# Patient Record
Sex: MALE | Race: BLACK OR AFRICAN AMERICAN | NOT HISPANIC OR LATINO | Employment: STUDENT | URBAN - METROPOLITAN AREA
[De-identification: names, ages, dates, MRNs, and addresses within clinical notes are randomized per-mention and may not be internally consistent; named-entity substitution may affect disease eponyms.]

---
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
## 2023-01-12 ENCOUNTER — HOSPITAL ENCOUNTER (OUTPATIENT)
Dept: CARDIOLOGY | Facility: HOSPITAL | Age: 18
Discharge: HOME | End: 2023-01-12
Attending: NURSE PRACTITIONER
Payer: COMMERCIAL

## 2023-01-12 DIAGNOSIS — Z79.899 ENCOUNTER FOR LONG-TERM (CURRENT) USE OF OTHER MEDICATIONS: ICD-10-CM

## 2023-01-12 PROCEDURE — 93005 ELECTROCARDIOGRAM TRACING: CPT | Mod: PO

## 2023-01-12 PROCEDURE — 93010 ELECTROCARDIOGRAM REPORT: CPT | Mod: ,,, | Performed by: INTERNAL MEDICINE

## 2023-01-12 PROCEDURE — 93010 EKG 12-LEAD: ICD-10-PCS | Mod: ,,, | Performed by: INTERNAL MEDICINE

## 2023-10-15 ENCOUNTER — HOSPITAL ENCOUNTER (EMERGENCY)
Facility: HOSPITAL | Age: 18
Discharge: HOME | End: 2023-10-15
Attending: EMERGENCY MEDICINE
Payer: COMMERCIAL

## 2023-10-15 VITALS
SYSTOLIC BLOOD PRESSURE: 132 MMHG | HEIGHT: 69 IN | OXYGEN SATURATION: 99 % | WEIGHT: 219.56 LBS | TEMPERATURE: 98 F | RESPIRATION RATE: 19 BRPM | HEART RATE: 85 BPM | BODY MASS INDEX: 32.52 KG/M2 | DIASTOLIC BLOOD PRESSURE: 85 MMHG

## 2023-10-15 DIAGNOSIS — S63.636A SPRAIN OF INTERPHALANGEAL JOINT OF RIGHT LITTLE FINGER, INITIAL ENCOUNTER: Primary | ICD-10-CM

## 2023-10-15 DIAGNOSIS — J06.9 VIRAL URI: ICD-10-CM

## 2023-10-15 LAB
INFLUENZA A, MOLECULAR: NEGATIVE
INFLUENZA B, MOLECULAR: NEGATIVE
SARS-COV-2 RDRP RESP QL NAA+PROBE: NEGATIVE
SPECIMEN SOURCE: NORMAL

## 2023-10-15 PROCEDURE — 25000003 PHARM REV CODE 250: Mod: ER | Performed by: NURSE PRACTITIONER

## 2023-10-15 PROCEDURE — U0002 COVID-19 LAB TEST NON-CDC: HCPCS | Mod: ER | Performed by: FAMILY MEDICINE

## 2023-10-15 PROCEDURE — 87502 INFLUENZA DNA AMP PROBE: CPT | Mod: ER | Performed by: FAMILY MEDICINE

## 2023-10-15 PROCEDURE — 99283 EMERGENCY DEPT VISIT LOW MDM: CPT | Mod: ER

## 2023-10-15 RX ORDER — ONDANSETRON 4 MG/1
4 TABLET, ORALLY DISINTEGRATING ORAL
Status: COMPLETED | OUTPATIENT
Start: 2023-10-15 | End: 2023-10-15

## 2023-10-15 RX ADMIN — ONDANSETRON 4 MG: 4 TABLET, ORALLY DISINTEGRATING ORAL at 04:10

## 2023-10-15 NOTE — ED PROVIDER NOTES
"Encounter Date: 10/15/2023       History     Chief Complaint   Patient presents with    Vomiting     Vomting started Friday. No fever. No diarrhea. Mom would like pt tested for covid. No vomiting today    Finger Pain     C/o right 5th digit finger pain. Football jammed finger. Redness and mild swelling no open wounds or deformit.     19 y/o male which presents to the ED with right 5th finger pain, swelling, and ecchymosis and vomiting yesterday. Mom states the child is coughing up "cold." Denies fevers. No other complaints. Tolerating food and liquids today.     The history is provided by the patient.     Review of patient's allergies indicates:  No Known Allergies  Past Medical History:   Diagnosis Date    ADHD (attention deficit hyperactivity disorder)     Asthma     Eczema     Speech delay      Past Surgical History:   Procedure Laterality Date    CIRCUMCISION, PRIMARY       Family History   Problem Relation Age of Onset    Hypertension Mother     Polycystic ovary syndrome Mother     Asthma Father     Hypertension Maternal Grandmother     Other Maternal Grandmother         reflux    Migraines Maternal Grandmother     Mental illness Maternal Grandmother     Hypertension Maternal Grandfather     Diabetes Maternal Grandfather     Asthma Paternal Grandfather     Mental illness Paternal Grandfather      Social History     Tobacco Use    Smoking status: Passive Smoke Exposure - Never Smoker    Smokeless tobacco: Current    Tobacco comments:     Mom smokes outside   Substance Use Topics    Alcohol use: No    Drug use: No     Review of Systems   Constitutional:  Negative for fever.   HENT:  Negative for sore throat.    Respiratory:  Negative for shortness of breath.    Cardiovascular:  Negative for chest pain.   Gastrointestinal:  Positive for vomiting (yesterday twice but since has resolved). Negative for nausea.   Genitourinary:  Negative for dysuria.   Musculoskeletal:  Positive for arthralgias and joint swelling. " Negative for back pain.   Skin:  Positive for color change. Negative for rash.   Neurological:  Negative for weakness.   Hematological:  Does not bruise/bleed easily.   All other systems reviewed and are negative.    Physical Exam     Initial Vitals [10/15/23 1636]   BP Pulse Resp Temp SpO2   132/85 85 19 98.3 °F (36.8 °C) 99 %      MAP       --         Physical Exam    Nursing note and vitals reviewed.  Constitutional: He appears well-developed and well-nourished. He is cooperative.  Non-toxic appearance.   HENT:   Head: Normocephalic and atraumatic.   Right Ear: Hearing and ear canal normal. Tympanic membrane is not injected and not perforated. A middle ear effusion is present.   Left Ear: Hearing and ear canal normal. Tympanic membrane is not injected and not perforated. A middle ear effusion is present.   Nose: Mucosal edema present.   Mouth/Throat: Uvula is midline and mucous membranes are normal.   Swollen turbinates noted to bilateral nares and cobblestone appearance to posterior oropharynx   Eyes: Conjunctivae and EOM are normal. Pupils are equal, round, and reactive to light.   Neck:   Normal range of motion.  Cardiovascular:  Normal rate, regular rhythm, S1 normal, S2 normal, normal heart sounds, intact distal pulses and normal pulses.     Exam reveals no gallop and no friction rub.       No murmur heard.  Pulmonary/Chest: Breath sounds normal. No stridor. No respiratory distress. He has no wheezes. He has no rhonchi. He has no rales. He exhibits no tenderness.   Musculoskeletal:         General: Tenderness and edema present. Normal range of motion.      Cervical back: Normal range of motion.      Comments: Mild edema and ecchymosis noted to right 5th finger- ROM WNL     Lymphadenopathy:     He has no cervical adenopathy.   Neurological: He is alert and oriented to person, place, and time. He has normal strength. GCS score is 15. GCS eye subscore is 4. GCS verbal subscore is 5. GCS motor subscore is 6.        ED Course   Procedures  Labs Reviewed   INFLUENZA A & B BY MOLECULAR   SARS-COV-2 RNA AMPLIFICATION, QUAL    Narrative:     Is the patient symptomatic?->Yes          Imaging Results              X-Ray Finger 2 or More Views Right (Final result)  Result time 10/15/23 18:14:54      Final result by Remi Carmen MD (10/15/23 18:14:54)                   Impression:      As above.      Electronically signed by: Remi Carmen  Date:    10/15/2023  Time:    18:14               Narrative:    EXAMINATION:  XR FINGER 2 OR MORE VIEWS RIGHT    CLINICAL HISTORY:  right 5th finger pain, swelling and bruising;    TECHNIQUE:  Three views 5th digit radiographs.    COMPARISON:  None    FINDINGS:  No fracture.  No traumatic malalignment.  No osseous destructive process.  Suspected soft tissue swelling.  No radiopaque foreign body.                                       Medications   ondansetron disintegrating tablet 4 mg (4 mg Oral Given 10/15/23 1649)     Medical Decision Making  17 y/o male which presents to the ED with multiple complaints. Mom wanted him tested for COVID which was negative. Influenza was also negative. Xray of finger was negative for fracture. Pt advised to take OTC meds for pain and for any congestion. Patient given strict return precautions and voiced understanding of all discharge instructions. Pt was stable at discharge.           Problems Addressed:  Sprain of interphalangeal joint of right little finger, initial encounter: acute illness or injury  Viral URI: acute illness or injury    Amount and/or Complexity of Data Reviewed  Labs:  Decision-making details documented in ED Course.  Radiology: ordered.    Risk  OTC drugs.  Prescription drug management.               ED Course as of 10/15/23 2138   Sun Oct 15, 2023   1705 SARS-CoV-2 RNA, Amplification, Qual: Negative [AT]   1705 Influenza A, Molecular: Negative [AT]   1705 Influenza B, Molecular: Negative [AT]   1719 BP: 132/85 [AT]   1719 Temp: 98.3  °F (36.8 °C) [AT]   1719 Temp Source: Oral [AT]   1719 Pulse: 85 [AT]   1719 Resp: 19 [AT]   1719 SpO2: 99 % [AT]      ED Course User Index  [AT] Samantha Felipe FNP                    Clinical Impression:   Final diagnoses:  [S63.165I] Sprain of interphalangeal joint of right little finger, initial encounter (Primary)  [J06.9] Viral URI        ED Disposition Condition    Discharge Stable          ED Prescriptions    None       Follow-up Information       Follow up With Specialties Details Why Contact Info    primary care provider as needed                 Samantha Felipe FNP  10/15/23 0059